# Patient Record
Sex: MALE | Race: WHITE | NOT HISPANIC OR LATINO | Employment: FULL TIME | URBAN - METROPOLITAN AREA
[De-identification: names, ages, dates, MRNs, and addresses within clinical notes are randomized per-mention and may not be internally consistent; named-entity substitution may affect disease eponyms.]

---

## 2020-06-15 ENCOUNTER — HISTORICAL (OUTPATIENT)
Dept: ADMINISTRATIVE | Facility: HOSPITAL | Age: 50
End: 2020-06-15

## 2020-09-24 ENCOUNTER — HISTORICAL (OUTPATIENT)
Dept: ADMINISTRATIVE | Facility: HOSPITAL | Age: 50
End: 2020-09-24

## 2020-09-24 LAB
(HCYS)2 SERPL-MCNC: 6.64 UMOL/L (ref 5.08–15.39)
ABS NEUT (OLG): 2.8 X10(3)/MCL (ref 2.1–9.2)
ALBUMIN SERPL-MCNC: 5 GM/DL (ref 3.4–5)
ALBUMIN/GLOB SERPL: 2.17 {RATIO} (ref 1.5–2.5)
ALP SERPL-CCNC: 67 UNIT/L (ref 38–126)
ALT SERPL-CCNC: 47 UNIT/L (ref 7–52)
APPEARANCE, UA: CLEAR
AST SERPL-CCNC: 39 UNIT/L (ref 15–37)
BACTERIA #/AREA URNS AUTO: NORMAL /HPF
BILIRUB SERPL-MCNC: 1.2 MG/DL (ref 0.2–1)
BILIRUB UR QL STRIP: NEGATIVE MG/DL
BILIRUBIN DIRECT+TOT PNL SERPL-MCNC: 0.3 MG/DL (ref 0–0.5)
BILIRUBIN DIRECT+TOT PNL SERPL-MCNC: 0.9 MG/DL
BUN SERPL-MCNC: 19 MG/DL (ref 7–18)
CALCIUM SERPL-MCNC: 10 MG/DL (ref 8.5–10.1)
CHLORIDE SERPL-SCNC: 99 MMOL/L (ref 98–107)
CHOLEST SERPL-MCNC: 198 MG/DL (ref 0–200)
CHOLEST/HDLC SERPL: 4.1 {RATIO}
CO2 SERPL-SCNC: 29 MMOL/L (ref 21–32)
COLOR UR: NORMAL
CREAT SERPL-MCNC: 1.03 MG/DL (ref 0.6–1.3)
DEPRECATED CALCIDIOL+CALCIFEROL SERPL-MC: 93.7 NG/ML (ref 30–80)
ERYTHROCYTE [DISTWIDTH] IN BLOOD BY AUTOMATED COUNT: 12.6 % (ref 11.5–17)
EST. AVERAGE GLUCOSE BLD GHB EST-MCNC: 105 MG/DL
GLOBULIN SER-MCNC: 2.3 GM/DL (ref 1.2–3)
GLUCOSE (UA): NEGATIVE MG/DL
GLUCOSE SERPL-MCNC: 95 MG/DL (ref 74–106)
HBA1C MFR BLD: 5.3 % (ref 4.4–6.4)
HCT VFR BLD AUTO: 51.3 % (ref 42–52)
HDLC SERPL-MCNC: 48 MG/DL (ref 35–60)
HGB BLD-MCNC: 17.5 GM/DL (ref 14–18)
HGB UR QL STRIP: NEGATIVE UNIT/L
KETONES UR QL STRIP: NEGATIVE MG/DL
LDLC SERPL CALC-MCNC: 147 MG/DL (ref 0–129)
LEUKOCYTE ESTERASE UR QL STRIP: NEGATIVE UNIT/L
LYMPHOCYTES # BLD AUTO: 2.3 X10(3)/MCL (ref 0.6–3.4)
LYMPHOCYTES NFR BLD AUTO: 39.7 % (ref 13–40)
MCH RBC QN AUTO: 30 PG (ref 27–31.2)
MCHC RBC AUTO-ENTMCNC: 34 GM/DL (ref 32–36)
MCV RBC AUTO: 88 FL (ref 80–94)
MONOCYTES # BLD AUTO: 0.7 X10(3)/MCL (ref 0.1–1.3)
MONOCYTES NFR BLD AUTO: 12.5 % (ref 0.1–24)
NEUTROPHILS NFR BLD AUTO: 47.8 % (ref 47–80)
NITRITE UR QL STRIP.AUTO: NEGATIVE
PH UR STRIP: 5.5 [PH]
PLATELET # BLD AUTO: 231 X10(3)/MCL (ref 130–400)
PMV BLD AUTO: 8.5 FL (ref 9.4–12.4)
POTASSIUM SERPL-SCNC: 4 MMOL/L (ref 3.5–5.1)
PROT SERPL-MCNC: 7.3 GM/DL (ref 6.4–8.2)
PROT UR QL STRIP: NEGATIVE MG/DL
PSA SERPL-MCNC: 0.19 NG/ML (ref 0–3.5)
RBC # BLD AUTO: 5.83 X10(6)/MCL (ref 4.7–6.1)
RBC #/AREA URNS HPF: NORMAL /HPF
SODIUM SERPL-SCNC: 137 MMOL/L (ref 136–145)
SP GR UR STRIP: 1.02
SQUAMOUS EPITHELIAL, UA: NORMAL /LPF
TESTOST SERPL-MCNC: 341 NG/DL (ref 300–1060)
TRIGL SERPL-MCNC: 107 MG/DL (ref 30–150)
TSH SERPL-ACNC: 1.4 MIU/ML (ref 0.35–4.94)
UROBILINOGEN UR STRIP-ACNC: 0.2 MG/DL
VLDLC SERPL CALC-MCNC: 21.4 MG/DL
WBC # SPEC AUTO: 5.8 X10(3)/MCL (ref 4.5–11.5)
WBC #/AREA URNS AUTO: NORMAL /[HPF]

## 2021-05-21 ENCOUNTER — HISTORICAL (OUTPATIENT)
Dept: ADMINISTRATIVE | Facility: HOSPITAL | Age: 51
End: 2021-05-21

## 2021-05-21 LAB
ALBUMIN SERPL-MCNC: 4.9 GM/DL (ref 3.4–5)
ALBUMIN/GLOB SERPL: 2.23 {RATIO} (ref 1.5–2.5)
ALP SERPL-CCNC: 85 UNIT/L (ref 38–126)
ALT SERPL-CCNC: 52 UNIT/L (ref 7–52)
AST SERPL-CCNC: 38 UNIT/L (ref 15–37)
BILIRUB SERPL-MCNC: 0.7 MG/DL (ref 0.2–1)
BILIRUBIN DIRECT+TOT PNL SERPL-MCNC: 0.2 MG/DL (ref 0–0.5)
BILIRUBIN DIRECT+TOT PNL SERPL-MCNC: 0.5 MG/DL
BUN SERPL-MCNC: 17 MG/DL (ref 7–18)
CALCIUM SERPL-MCNC: 9.9 MG/DL (ref 8.5–10.1)
CHLORIDE SERPL-SCNC: 102 MMOL/L (ref 98–107)
CHOLEST SERPL-MCNC: 199 MG/DL (ref 0–200)
CHOLEST/HDLC SERPL: 4.3 {RATIO}
CO2 SERPL-SCNC: 30 MMOL/L (ref 21–32)
CREAT SERPL-MCNC: 1 MG/DL (ref 0.6–1.3)
GLOBULIN SER-MCNC: 2.2 GM/DL (ref 1.2–3)
GLUCOSE SERPL-MCNC: 104 MG/DL (ref 74–106)
HDLC SERPL-MCNC: 46 MG/DL (ref 35–60)
LDLC SERPL CALC-MCNC: 142 MG/DL (ref 0–129)
POTASSIUM SERPL-SCNC: 4.8 MMOL/L (ref 3.5–5.1)
PROT SERPL-MCNC: 7.1 GM/DL (ref 6.4–8.2)
SODIUM SERPL-SCNC: 142 MMOL/L (ref 136–145)
TRIGL SERPL-MCNC: 93 MG/DL (ref 30–150)
VLDLC SERPL CALC-MCNC: 18.6 MG/DL

## 2022-04-10 ENCOUNTER — HISTORICAL (OUTPATIENT)
Dept: ADMINISTRATIVE | Facility: HOSPITAL | Age: 52
End: 2022-04-10

## 2022-04-26 VITALS
WEIGHT: 205 LBS | HEIGHT: 70 IN | DIASTOLIC BLOOD PRESSURE: 84 MMHG | BODY MASS INDEX: 29.35 KG/M2 | SYSTOLIC BLOOD PRESSURE: 130 MMHG

## 2022-08-23 PROBLEM — Z00.00 WELLNESS EXAMINATION: Status: ACTIVE | Noted: 2022-08-23

## 2022-11-28 PROBLEM — Z00.00 WELLNESS EXAMINATION: Status: RESOLVED | Noted: 2022-08-23 | Resolved: 2022-11-28

## 2023-06-20 ENCOUNTER — OFFICE VISIT (OUTPATIENT)
Dept: NEUROLOGY | Facility: CLINIC | Age: 53
End: 2023-06-20
Payer: COMMERCIAL

## 2023-06-20 VITALS
SYSTOLIC BLOOD PRESSURE: 142 MMHG | BODY MASS INDEX: 29.62 KG/M2 | WEIGHT: 200 LBS | HEIGHT: 69 IN | DIASTOLIC BLOOD PRESSURE: 94 MMHG

## 2023-06-20 DIAGNOSIS — R25.1 TREMOR, UNSPECIFIED: Primary | ICD-10-CM

## 2023-06-20 PROCEDURE — 99999 PR PBB SHADOW E&M-EST. PATIENT-LVL III: ICD-10-PCS | Mod: PBBFAC,,, | Performed by: SPECIALIST

## 2023-06-20 PROCEDURE — 3077F SYST BP >= 140 MM HG: CPT | Mod: CPTII,S$GLB,, | Performed by: SPECIALIST

## 2023-06-20 PROCEDURE — 1160F RVW MEDS BY RX/DR IN RCRD: CPT | Mod: CPTII,S$GLB,, | Performed by: SPECIALIST

## 2023-06-20 PROCEDURE — 99999 PR PBB SHADOW E&M-EST. PATIENT-LVL III: CPT | Mod: PBBFAC,,, | Performed by: SPECIALIST

## 2023-06-20 PROCEDURE — 99204 OFFICE O/P NEW MOD 45 MIN: CPT | Mod: S$GLB,,, | Performed by: SPECIALIST

## 2023-06-20 PROCEDURE — 1160F PR REVIEW ALL MEDS BY PRESCRIBER/CLIN PHARMACIST DOCUMENTED: ICD-10-PCS | Mod: CPTII,S$GLB,, | Performed by: SPECIALIST

## 2023-06-20 PROCEDURE — 3008F BODY MASS INDEX DOCD: CPT | Mod: CPTII,S$GLB,, | Performed by: SPECIALIST

## 2023-06-20 PROCEDURE — 3080F PR MOST RECENT DIASTOLIC BLOOD PRESSURE >= 90 MM HG: ICD-10-PCS | Mod: CPTII,S$GLB,, | Performed by: SPECIALIST

## 2023-06-20 PROCEDURE — 3008F PR BODY MASS INDEX (BMI) DOCUMENTED: ICD-10-PCS | Mod: CPTII,S$GLB,, | Performed by: SPECIALIST

## 2023-06-20 PROCEDURE — 3077F PR MOST RECENT SYSTOLIC BLOOD PRESSURE >= 140 MM HG: ICD-10-PCS | Mod: CPTII,S$GLB,, | Performed by: SPECIALIST

## 2023-06-20 PROCEDURE — 1159F PR MEDICATION LIST DOCUMENTED IN MEDICAL RECORD: ICD-10-PCS | Mod: CPTII,S$GLB,, | Performed by: SPECIALIST

## 2023-06-20 PROCEDURE — 3080F DIAST BP >= 90 MM HG: CPT | Mod: CPTII,S$GLB,, | Performed by: SPECIALIST

## 2023-06-20 PROCEDURE — 1159F MED LIST DOCD IN RCRD: CPT | Mod: CPTII,S$GLB,, | Performed by: SPECIALIST

## 2023-06-20 PROCEDURE — 99204 PR OFFICE/OUTPT VISIT, NEW, LEVL IV, 45-59 MIN: ICD-10-PCS | Mod: S$GLB,,, | Performed by: SPECIALIST

## 2023-06-20 RX ORDER — FEXOFENADINE HCL AND PSEUDOEPHEDRINE HCI 180; 240 MG/1; MG/1
1 TABLET, EXTENDED RELEASE ORAL DAILY
COMMUNITY

## 2023-06-20 NOTE — PROGRESS NOTES
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    Chief Complaint: Tremors     HPI:            (The pt states that the tremors has been going on for about a year and this is the first time seeking treatment for tremors. The tremors are in both arms, cheek, tongue, and legs. The pt states that the first tremors noted in the left arm.  )      Notes may also be on facesheet for HPI, ROS, and other sections     Review of Systems   No RBD      Social History     Tobacco Use    Smoking status: Never    Smokeless tobacco: Never   Substance Use Topics    Alcohol use: Not Currently    Drug use: Never      [] Single     [x]     [] []    [x] Working     [] Retired, worked as:   [x] Drives     [] Does not drive       ----------------------------  ADHD  GERD (gastroesophageal reflux disease)  Nontoxic single thyroid nodule  SI (sacroiliac) joint dysfunction    Current Outpatient Medications   Medication Instructions    dextroamphetamine-amphetamine 5 mg Tab 10 mg, Oral, 2 times daily    diphenhydrAMINE (SOMINEX) 25 mg, Oral, Nightly PRN    famotidine (PEPCID) 20 mg, Oral, 2 times daily    fexofenadine-pseudoephedrine (ALLEGRA-D 24 HOUR) 180-240 mg per 24 hr tablet 1 tablet, Oral, Daily    melatonin 10 mg Tab Oral    montelukast (SINGULAIR) 10 mg, Oral, Daily      There are no discontinued medications.      Objective:        Exam:   Visit Vitals  BP (!) 142/94 (BP Location: Left arm, Patient Position: Sitting, BP Method: Medium (Manual))   Ht 5' 9" (1.753 m)   Wt 90.7 kg (200 lb)   BMI 29.53 kg/m²       General Exam  [] Unaccompanied   [x] Accompanied, by__ [x]Spouse     []Child            []_____________            body habitus_ Body mass index is 29.53 kg/m².    [x]Gen exam overall unremarkable    []Abnormalities, if present, checked     []Mental Status_alert and appropriate    []Oropharynx_Mallampati grade_1 or 2  [] OP   M3    [] M4  []Neck_ no bruits     [] Bruit "   []Heart__ RRR s murmurs or extra beats  [] Irreg  []murmur  []Extremities_ no edema or lesions   [] Extr edema     Neurological:  [x]Normal neuro exam            []Cortical function seems normal  []Abnormalities, if present, checked     []Speech __ normal    []    []Cranial nerves:    []  []CN 2 VF_ok     []  []Fundi_ normal     []  []CN 3, 4, 6 EOMs_ok   []  []CN 3, pupils_ok    []  []CN 7_no lower face asymmetry  []  []CN 8_hearing _ ok   []  []CN 12 tongue_ok    []    []Motor__ normal all groups    []  []Tone: normal     []  []Reflexes__ normal or unremarkable   []  []Vib Sens_ normal in extr's incl toes   []  []Pin Sens_    []  []Plantars__ flat     []  []Tremor: _ none     []  []Coordination: _ F to N normal; dexterity normal  []  []Gait_ incl toes heels and tandem   []  []Romberg: negative    []      Decreased blink freq       Neuroimaging:  Study / Studies:   [] Images and imaging reports reviewed.      Rads summary:          My comments:     Labs:      [x]  New Patient     []  Multiple Issues/ diagnoses or problems  [if not enumerated in note then discussed but not documented]    Complexity of Data:      [] High   [x] Moderate   [] Images and reports reviewed:  [] History obtained from family or accompaniment:   [] Other studies reviewed   [x] Studies considered or discussed but not ordered __ DELON scan  [] Studies ordered __   [x] Differential Diagnoses discussed __    Risks:   [] High   [x] Moderate   [x] (poss or definite) neurodegenerative condition and inherent progression  [] () autoimmune condition with possibility of flares or unexpected attack  [] () seiz d.o. with possib of recurr seiz's   [] Cerebrovasc ds with risk of recurrent stroke  [] CNS meds (and/or) potentially high risk non CNS meds taken or discussed which may cause med or behav SE's  [] Fall risk  [] Driving discussed   [x] Diagnosis unclear or DDx wide making risk uncertain   []:    MDM:      [] High      [x] Moderate          Assessment/Plan:         ICD-10-CM ICD-9-CM   1. Tremor, unspecified  R25.1 781.0   Possible incipient PD;  possible ET    Discussed DELON scan  did not order     Discussed scheduling fu but he preferred to leave unscheduled at present           Other Comments / Follow Up:          Follow up for PRN.        MD BHARAT KyleA  [facility] Medical Director   FAAN, FAA

## 2023-08-25 ENCOUNTER — HOSPITAL ENCOUNTER (EMERGENCY)
Facility: HOSPITAL | Age: 53
Discharge: HOME | End: 2023-08-25
Attending: EMERGENCY MEDICINE
Payer: COMMERCIAL

## 2023-08-25 VITALS
SYSTOLIC BLOOD PRESSURE: 122 MMHG | HEIGHT: 69 IN | TEMPERATURE: 98 F | HEART RATE: 89 BPM | DIASTOLIC BLOOD PRESSURE: 78 MMHG | WEIGHT: 200 LBS | BODY MASS INDEX: 29.62 KG/M2 | OXYGEN SATURATION: 95 % | RESPIRATION RATE: 19 BRPM

## 2023-08-25 DIAGNOSIS — K80.50 BILIARY COLIC: Primary | ICD-10-CM

## 2023-08-25 DIAGNOSIS — R10.13 EPIGASTRIC PAIN: ICD-10-CM

## 2023-08-25 LAB
ALBUMIN SERPL-MCNC: 4.8 G/DL (ref 3.5–5)
ALBUMIN/GLOB SERPL: 1.8 RATIO (ref 1.1–2)
ALP SERPL-CCNC: 82 UNIT/L (ref 40–150)
ALT SERPL-CCNC: 70 UNIT/L (ref 0–55)
APPEARANCE UR: CLEAR
AST SERPL-CCNC: 47 UNIT/L (ref 5–34)
BACTERIA #/AREA URNS AUTO: NORMAL /HPF
BASOPHILS # BLD AUTO: 0.06 X10(3)/MCL
BASOPHILS NFR BLD AUTO: 0.4 %
BILIRUB SERPL-MCNC: 1 MG/DL
BILIRUB UR QL STRIP.AUTO: NEGATIVE
BUN SERPL-MCNC: 17.3 MG/DL (ref 8.4–25.7)
CALCIUM SERPL-MCNC: 10.2 MG/DL (ref 8.4–10.2)
CHLORIDE SERPL-SCNC: 104 MMOL/L (ref 98–107)
CO2 SERPL-SCNC: 23 MMOL/L (ref 22–29)
COLOR UR: ABNORMAL
CREAT SERPL-MCNC: 0.9 MG/DL (ref 0.73–1.18)
EOSINOPHIL # BLD AUTO: 0 X10(3)/MCL (ref 0–0.9)
EOSINOPHIL NFR BLD AUTO: 0 %
ERYTHROCYTE [DISTWIDTH] IN BLOOD BY AUTOMATED COUNT: 12.3 % (ref 11.5–17)
GFR SERPLBLD CREATININE-BSD FMLA CKD-EPI: >60 MLS/MIN/1.73/M2
GLOBULIN SER-MCNC: 2.7 GM/DL (ref 2.4–3.5)
GLUCOSE SERPL-MCNC: 137 MG/DL (ref 74–100)
GLUCOSE UR QL STRIP.AUTO: NEGATIVE
HCT VFR BLD AUTO: 45 % (ref 42–52)
HGB BLD-MCNC: 16.2 G/DL (ref 14–18)
IMM GRANULOCYTES # BLD AUTO: 0.04 X10(3)/MCL (ref 0–0.04)
IMM GRANULOCYTES NFR BLD AUTO: 0.3 %
KETONES UR QL STRIP.AUTO: ABNORMAL
LEUKOCYTE ESTERASE UR QL STRIP.AUTO: NEGATIVE
LIPASE SERPL-CCNC: 15 U/L
LYMPHOCYTES # BLD AUTO: 0.97 X10(3)/MCL (ref 0.6–4.6)
LYMPHOCYTES NFR BLD AUTO: 6.5 %
MCH RBC QN AUTO: 30.4 PG (ref 27–31)
MCHC RBC AUTO-ENTMCNC: 36 G/DL (ref 33–36)
MCV RBC AUTO: 84.4 FL (ref 80–94)
MONOCYTES # BLD AUTO: 0.78 X10(3)/MCL (ref 0.1–1.3)
MONOCYTES NFR BLD AUTO: 5.2 %
NEUTROPHILS # BLD AUTO: 13.03 X10(3)/MCL (ref 2.1–9.2)
NEUTROPHILS NFR BLD AUTO: 87.6 %
NITRITE UR QL STRIP.AUTO: NEGATIVE
NRBC BLD AUTO-RTO: 0 %
PH UR STRIP.AUTO: 5.5 [PH]
PLATELET # BLD AUTO: 268 X10(3)/MCL (ref 130–400)
PMV BLD AUTO: 8.8 FL (ref 7.4–10.4)
POTASSIUM SERPL-SCNC: 4 MMOL/L (ref 3.5–5.1)
PROT SERPL-MCNC: 7.5 GM/DL (ref 6.4–8.3)
PROT UR QL STRIP.AUTO: ABNORMAL
RBC # BLD AUTO: 5.33 X10(6)/MCL (ref 4.7–6.1)
RBC #/AREA URNS AUTO: <5 /HPF
RBC UR QL AUTO: NEGATIVE
SODIUM SERPL-SCNC: 138 MMOL/L (ref 136–145)
SP GR UR STRIP.AUTO: 1.03 (ref 1–1.03)
SQUAMOUS #/AREA URNS AUTO: <5 /HPF
TROPONIN I SERPL-MCNC: <0.01 NG/ML (ref 0–0.04)
UROBILINOGEN UR STRIP-ACNC: 1
WBC # SPEC AUTO: 14.88 X10(3)/MCL (ref 4.5–11.5)
WBC #/AREA URNS AUTO: <5 /HPF

## 2023-08-25 PROCEDURE — 83690 ASSAY OF LIPASE: CPT

## 2023-08-25 PROCEDURE — 85025 COMPLETE CBC W/AUTO DIFF WBC: CPT

## 2023-08-25 PROCEDURE — 99285 EMERGENCY DEPT VISIT HI MDM: CPT | Mod: 25

## 2023-08-25 PROCEDURE — 25000003 PHARM REV CODE 250: Performed by: EMERGENCY MEDICINE

## 2023-08-25 PROCEDURE — 96361 HYDRATE IV INFUSION ADD-ON: CPT

## 2023-08-25 PROCEDURE — 81001 URINALYSIS AUTO W/SCOPE: CPT

## 2023-08-25 PROCEDURE — 96375 TX/PRO/DX INJ NEW DRUG ADDON: CPT

## 2023-08-25 PROCEDURE — 84484 ASSAY OF TROPONIN QUANT: CPT

## 2023-08-25 PROCEDURE — 80053 COMPREHEN METABOLIC PANEL: CPT

## 2023-08-25 PROCEDURE — 93005 ELECTROCARDIOGRAM TRACING: CPT

## 2023-08-25 PROCEDURE — 96374 THER/PROPH/DIAG INJ IV PUSH: CPT

## 2023-08-25 PROCEDURE — 63600175 PHARM REV CODE 636 W HCPCS

## 2023-08-25 PROCEDURE — 63600175 PHARM REV CODE 636 W HCPCS: Performed by: EMERGENCY MEDICINE

## 2023-08-25 RX ORDER — ONDANSETRON 4 MG/1
4 TABLET, ORALLY DISINTEGRATING ORAL EVERY 6 HOURS PRN
Qty: 20 TABLET | Refills: 0 | Status: ON HOLD | OUTPATIENT
Start: 2023-08-25 | End: 2023-09-01 | Stop reason: SDUPTHER

## 2023-08-25 RX ORDER — FAMOTIDINE 40 MG/1
40 TABLET, FILM COATED ORAL NIGHTLY
Qty: 30 TABLET | Refills: 0 | Status: SHIPPED | OUTPATIENT
Start: 2023-08-25 | End: 2023-09-24

## 2023-08-25 RX ORDER — METOCLOPRAMIDE HYDROCHLORIDE 5 MG/ML
10 INJECTION INTRAMUSCULAR; INTRAVENOUS
Status: COMPLETED | OUTPATIENT
Start: 2023-08-25 | End: 2023-08-25

## 2023-08-25 RX ORDER — FAMOTIDINE 10 MG/ML
40 INJECTION INTRAVENOUS
Status: COMPLETED | OUTPATIENT
Start: 2023-08-25 | End: 2023-08-25

## 2023-08-25 RX ADMIN — METOCLOPRAMIDE 10 MG: 5 INJECTION, SOLUTION INTRAMUSCULAR; INTRAVENOUS at 10:08

## 2023-08-25 RX ADMIN — SODIUM CHLORIDE, POTASSIUM CHLORIDE, SODIUM LACTATE AND CALCIUM CHLORIDE 1000 ML: 600; 310; 30; 20 INJECTION, SOLUTION INTRAVENOUS at 10:08

## 2023-08-25 RX ADMIN — FAMOTIDINE 40 MG: 10 INJECTION, SOLUTION INTRAVENOUS at 10:08

## 2023-08-26 NOTE — FIRST PROVIDER EVALUATION
"Medical screening examination initiated.  I have conducted a focused provider triage encounter, findings are as follows:    Brief history of present illness:  52 year old male presents to ER with c/o diffuse abdominal pain onset 10 am. Reports history of GERD. +N/V    Vitals:    08/25/23 2030   BP: (!) 147/87   Pulse: 89   Resp: 16   Temp: 97.8 °F (36.6 °C)   TempSrc: Oral   SpO2: 97%   Weight: 90.7 kg (200 lb)   Height: 5' 9" (1.753 m)       Pertinent physical exam:  Awake and alert, NAD    Brief workup plan:  Labs, UA, imaging    Preliminary workup initiated; this workup will be continued and followed by the physician or advanced practice provider that is assigned to the patient when roomed.  "

## 2023-08-26 NOTE — ED PROVIDER NOTES
Encounter Date: 8/25/2023    SCRIBE #1 NOTE: I, Bruna Desir, am scribing for, and in the presence of,  Barbi Barnett MD. I have scribed the following portions of the note - Other sections scribed: HPI, ROS, PE.       History     Chief Complaint   Patient presents with    Abdominal Pain     Pt reports abdominal pain that started this morning diffuse across abdomen. Covid neg this afternoon, Denies diarrhea. States abdominal pain radiates to epigastric and reports history of GERD.      52 year old male with a history of GERD, sacroilial joint dysfunction, nontoxic single thyroid nodule, vasectomy, and peg tube removal presents to ED for pressure-like epigastric abdominal pain that radiates down his sides since 10:00 today. He rates the pain as 8/10 initially, but improved to 6/10 currently. States the pain on his sides are 4/10. Pt says he tried to vomit and pass bowel movements with no relief, last BM this morning. Reports decreased urination and pain worsen with liquid intake. He denies diarrhea, eating spicy food, smoking, fever, and EtOH consumption.  Pt reports he was exposed to Covid yesterday, and was found negative after getting tested earlier this afternoon. Notes a previous similar event last year. Also notes taking 4 mg Zofran at 14:00 today provided relief for a few hours.    PCP: Low Yoon MD        The history is provided by the patient. No  was used.     Review of patient's allergies indicates:  No Known Allergies  Past Medical History:   Diagnosis Date    ADHD     GERD (gastroesophageal reflux disease)     Nontoxic single thyroid nodule     SI (sacroiliac) joint dysfunction      Past Surgical History:   Procedure Laterality Date    PEG TUBE REMOVAL      SINUS SURGERY      VASECTOMY      WISDOM TOOTH EXTRACTION       No family history on file.  Social History     Tobacco Use    Smoking status: Never    Smokeless tobacco: Never   Substance Use Topics    Alcohol use: Not  Currently    Drug use: Never     Review of Systems   Constitutional:  Negative for chills, diaphoresis and fever.   HENT:  Negative for congestion, ear pain, sinus pain and sore throat.    Eyes:  Negative for pain, discharge and visual disturbance.   Respiratory:  Negative for cough, shortness of breath, wheezing and stridor.    Cardiovascular:  Negative for chest pain and palpitations.   Gastrointestinal:  Positive for abdominal pain, nausea and vomiting. Negative for constipation, diarrhea and rectal pain.   Genitourinary:  Positive for decreased urine volume. Negative for dysuria and hematuria.   Musculoskeletal:  Negative for back pain and myalgias.   Skin:  Negative for rash.   Neurological:  Negative for dizziness, syncope, numbness and headaches.   Hematological: Negative.    Psychiatric/Behavioral: Negative.     All other systems reviewed and are negative.      Physical Exam     Initial Vitals [08/25/23 2030]   BP Pulse Resp Temp SpO2   (!) 147/87 89 16 97.8 °F (36.6 °C) 97 %      MAP       --         Physical Exam    Nursing note and vitals reviewed.  Constitutional: He appears well-developed and well-nourished. He is not diaphoretic. No distress.   HENT:   Head: Normocephalic and atraumatic.   Nose: Nose normal.   Mouth/Throat: Oropharynx is clear and moist.   Eyes: Conjunctivae and EOM are normal. Pupils are equal, round, and reactive to light.   Neck: Trachea normal. Neck supple.   Normal range of motion.  Cardiovascular:  Normal rate, regular rhythm, normal heart sounds and intact distal pulses.     Exam reveals no gallop and no friction rub.       No murmur heard.  Pulmonary/Chest: Breath sounds normal. No respiratory distress. He has no wheezes. He has no rhonchi. He has no rales. He exhibits no tenderness.   Abdominal: Abdomen is soft. He exhibits no distension and no mass. Bowel sounds are increased. There is generalized abdominal tenderness and tenderness in the right upper quadrant.   Hyperactive  bowel sounds There is positive Rushing's sign. There is no rebound.   Musculoskeletal:         General: No tenderness or edema. Normal range of motion.      Cervical back: Normal range of motion and neck supple.      Lumbar back: Normal. No tenderness. Normal range of motion.     Neurological: He is alert and oriented to person, place, and time. He has normal strength. No cranial nerve deficit or sensory deficit.   Skin: Skin is warm and dry. Capillary refill takes less than 2 seconds. No rash and no abscess noted. No erythema. No pallor.   Psychiatric: He has a normal mood and affect. His behavior is normal. Judgment and thought content normal.         ED Course   Procedures  Labs Reviewed   COMPREHENSIVE METABOLIC PANEL - Abnormal; Notable for the following components:       Result Value    Glucose Level 137 (*)     Alanine Aminotransferase 70 (*)     Aspartate Aminotransferase 47 (*)     All other components within normal limits   URINALYSIS, REFLEX TO URINE CULTURE - Abnormal; Notable for the following components:    Specific Gravity, UA 1.031 (*)     Protein, UA Trace (*)     Ketones, UA Trace (*)     All other components within normal limits   CBC WITH DIFFERENTIAL - Abnormal; Notable for the following components:    WBC 14.88 (*)     Neut # 13.03 (*)     All other components within normal limits   LIPASE - Normal   TROPONIN I - Normal   URINALYSIS, MICROSCOPIC - Normal   CBC W/ AUTO DIFFERENTIAL    Narrative:     The following orders were created for panel order CBC auto differential.  Procedure                               Abnormality         Status                     ---------                               -----------         ------                     CBC with Differential[[id number]]        Abnormal            Final result                 Please view results for these tests on the individual orders.          Imaging Results              US Abdomen Limited (Final result)  Result time 08/25/23 23:05:11       Final result by Juan Nguyen MD (08/25/23 23:05:11)                   Impression:      1. Diffuse hepatic steatosis.  2. Gallbladder sludge and cholelithiasis without cholecystitis.      Electronically signed by: Juan Nguyen MD  Date:    08/25/2023  Time:    23:05               Narrative:    EXAMINATION:  US ABDOMEN LIMITED    CLINICAL HISTORY:  right upper quadrant pain;    TECHNIQUE:  Grayscale and color Doppler images of the abdomen are obtained.    COMPARISON:  None    FINDINGS:  The pancreas is not well visualized due to overlying bowel gas.  The aorta is nonaneurysmal.    The liver is diffusely hyperechogenic.  There is no solid intrahepatic mass.  There is a simple cyst in the left hepatic lobe measuring 2.5 cm.  The main portal vein is patent with hepatopetal flow.  The CBD measures 5 mm.  There is echogenic sludge in the gallbladder.  There is a stone in the gallbladder.  There is no wall thickening or pericholecystic fluid.  There is no sonographic Rushing sign.  There is no ascites.    The right kidney measures 11.4 cm.  There is no hydronephrosis or nephrolithiasis of the right kidney.                                       Medications   metoclopramide HCl injection 10 mg (10 mg Intravenous Given 8/25/23 2210)   lactated ringers bolus 1,000 mL (0 mLs Intravenous Stopped 8/25/23 2311)   famotidine (PF) injection 40 mg (40 mg Intravenous Given 8/25/23 2211)     Medical Decision Making  The differential diagnosis includes, but is not limited to: pancreatitis, GERD, and biliary colic.   Cbc, cmp, trop, lipase, ua, abd us, ekg ordered and reviewed   Mild leukocytosis and transaminitis (similar to previous) noted  Given reglan, ivf and pepcid with improvement  Us with signs of cholelithiasis without cholecystitis  Comfortable with dc with symptomatic managemetna nd outpatient surgery erica  Discussed strict return precautions and he voiced understanding and agrees and is comfortable with plan    Problems  Addressed:  Biliary colic: acute illness or injury that poses a threat to life or bodily functions  Epigastric pain: acute illness or injury that poses a threat to life or bodily functions    Amount and/or Complexity of Data Reviewed  External Data Reviewed: labs.  Labs: ordered. Decision-making details documented in ED Course.  Radiology: ordered and independent interpretation performed. Decision-making details documented in ED Course.    Risk  OTC drugs.  Prescription drug management.            Scribe Attestation:   Scribe #1: I performed the above scribed service and the documentation accurately describes the services I performed. I attest to the accuracy of the note.  Comments: Attending:   Physician Attestation Statement for Scribe #1: Barbi MICHAEL MD, personally performed the services described in this documentation. All medical record entries made by the scribe were at my direction and in my presence.  I have reviewed the chart and agree that the record reflects my personal performance and is accurate and complete.        Attending Attestation:           Physician Attestation for Scribe:  Physician Attestation Statement for Scribe #1: Anibal MICHAEL Brooke R, MD, reviewed documentation, as scribed by Bruna Desir in my presence, and it is both accurate and complete.             ED Course as of 08/26/23 0216   Fri Aug 25, 2023   2208 Ekg performed at 2205 rate 82 bpm sinus rhythm without acute st or t wave abnormality [BS]   2258 Pain improved [BS]   2332 Feeling better, comfortable with dc [BS]      ED Course User Index  [BS] Barbi Barnett MD               Medical Decision Making:   History:   Old Medical Records: I decided to obtain old medical records.  Old Records Summarized: records from clinic visits.       <> Summary of Records: adhd  Initial Assessment:   See hpi  Independently Interpreted Test(s):   I have ordered and independently interpreted EKG Reading(s) - see prior notes  Clinical  Tests:   Lab Tests: Ordered and Reviewed  Radiological Study: Reviewed and Ordered  Medical Tests: Ordered and Reviewed      Clinical Impression:   Final diagnoses:  [R10.13] Epigastric pain  [K80.50] Biliary colic (Primary)        ED Disposition Condition    Discharge Stable          ED Prescriptions       Medication Sig Dispense Start Date End Date Auth. Provider    famotidine (PEPCID) 40 MG tablet Take 1 tablet (40 mg total) by mouth every evening. 30 tablet 8/25/2023 9/24/2023 Barbi Barnett MD    ondansetron (ZOFRAN-ODT) 4 MG TbDL Take 1 tablet (4 mg total) by mouth every 6 (six) hours as needed (nausea, vomiting). 20 tablet 8/25/2023 -- Barbi Barnett MD          Follow-up Information       Follow up With Specialties Details Why Contact Info    Zi Rubi MD General Surgery Schedule an appointment as soon as possible for a visit   [address]  [address]  [facility] [address]  [phone]      [facility] - Emergency Dept Emergency Medicine  As needed, If symptoms worsen [address] [place] [id number] [phone]             Barbi Barnett MD  08/26/23 0216

## 2023-08-26 NOTE — DISCHARGE INSTRUCTIONS
Schedule an appointment with the surgeon of your choice, avoid fatty foods, return for re-evaluation if you experience any worsening

## 2023-08-29 PROBLEM — K80.20 CHOLELITHIASES: Status: ACTIVE | Noted: 2023-08-29

## 2023-08-29 NOTE — H&P
[facility] Surgical - Periop Services  General Surgery  History & Physical    Patient Name: Indira Robles  MRN: [id number]  Admission Date: 9/1/23  Attending Physician: Kobe Keenan MD   Primary Care Provider: Ginny Primary Doctor    Patient information was obtained from patient.     Subjective:     Chief Complaint/Reason for Admission: Abd pain     History of Present Illness:  Patient is a 52 y.o. male presents with symptomatic cholelithiasis. Ready to proceed with Lap leonardo today.    No current facility-administered medications on file prior to encounter.     Current Outpatient Medications on File Prior to Encounter   Medication Sig    dextroamphetamine-amphetamine 5 mg Tab Take 10 mg by mouth 2 (two) times daily. (Patient not taking: Reported on 6/20/2023)    diphenhydrAMINE (SOMINEX) 25 mg tablet Take 25 mg by mouth nightly as needed for Insomnia.    famotidine (PEPCID) 40 MG tablet Take 1 tablet (40 mg total) by mouth every evening.    fexofenadine-pseudoephedrine (ALLEGRA-D 24 HOUR) 180-240 mg per 24 hr tablet Take 1 tablet by mouth once daily.    melatonin 10 mg Tab Take by mouth.    montelukast (SINGULAIR) 10 mg tablet Take 10 mg by mouth once daily at 6am.    ondansetron (ZOFRAN-ODT) 4 MG TbDL Take 1 tablet (4 mg total) by mouth every 6 (six) hours as needed (nausea, vomiting).       Review of patient's allergies indicates:  No Known Allergies    Past Medical History:   Diagnosis Date    ADHD     GERD (gastroesophageal reflux disease)     Nontoxic single thyroid nodule     SI (sacroiliac) joint dysfunction      Past Surgical History:   Procedure Laterality Date    SINUS SURGERY      VASECTOMY      WISDOM TOOTH EXTRACTION       Family History    None       Tobacco Use    Smoking status: Never    Smokeless tobacco: Never   Substance and Sexual Activity    Alcohol use: Not Currently    Drug use: Never    Sexual activity: Yes     Partners: Female     Review of Systems   HENT: Negative.      Cardiovascular: Negative.    Gastrointestinal:  Positive for abdominal pain.   Musculoskeletal: Negative.    Psychiatric/Behavioral: Negative.       Objective:     Vital Signs (Most Recent):    Vital Signs (24h Range):        Weight: 90.7 kg (200 lb)  Body mass index is 29.53 kg/m².    Physical Exam  Vitals reviewed.   Constitutional:       General: He is not in acute distress.     Appearance: Normal appearance.   HENT:      Head: Normocephalic.   Cardiovascular:      Rate and Rhythm: Normal rate and regular rhythm.      Heart sounds: Normal heart sounds.   Pulmonary:      Effort: Pulmonary effort is normal.      Breath sounds: Normal breath sounds.   Abdominal:      General: Abdomen is flat. Bowel sounds are normal.      Palpations: Abdomen is soft.   Musculoskeletal:         General: Normal range of motion.   Skin:     General: Skin is warm and dry.   Neurological:      General: No focal deficit present.      Mental Status: He is alert and oriented to person, place, and time.   Psychiatric:         Mood and Affect: Mood normal.         Behavior: Behavior normal.         Significant Labs:  I have reviewed all pertinent lab results within the past 24 hours.    Significant Diagnostics:  I have reviewed all pertinent imaging results/findings within the past 24 hours.    Assessment/Plan:     Active Diagnoses:    Diagnosis Date Noted POA    PRINCIPAL PROBLEM:  Cholelithiases [K80.20] 08/29/2023 Yes      Problems Resolved During this Admission:     VTE Risk Mitigation (From admission, onward)      None        Radha Keenan examined patient, obtained informed consent, and answered all questions.     Lesley Albert, ANP  General Surgery  [facility] - Periop Services

## 2023-08-31 ENCOUNTER — ANESTHESIA EVENT (OUTPATIENT)
Dept: SURGERY | Facility: HOSPITAL | Age: 53
End: 2023-08-31
Payer: COMMERCIAL

## 2023-08-31 NOTE — DISCHARGE INSTRUCTIONS
Dr. Keenan's Discharge Instructions   Laparoscopic Cholecystectomy     Dr. Keenan's Sutured Wound Care Instructions:    - Keep surgical dressing clean and dry for 48 hours post op, then ok to remove dressing and shower.  - Wash incision daily with antibacterial soap and water. Pat dry.   - Do not remove steri strips for 5-7 days post op. After post op day 7, ok to remove steri strips once edges of steri strips begin to curl. Do not keep steri strips in place longer than 10 days after surgery.     No lifting (over 10 pounds) or straining for 2 weeks after surgery.    No driving for 3 days after surgery, or as long as taking narcotic pain medication.     Follow low fat diet (gallbladder eating plan listed below) for the first 4 weeks after surgery. After 4 weeks, ok to advance to regular diet as tolerated.     Contact Dr. Keenan's office with any questions or concerns. [phone]      Cholelithiasis    Cholelithiasis is a form of gallbladder disease in which gallstones form in the gallbladder. The gallbladder is an organ that stores bile. Bile is made in the liver, and it helps to digest fats. Gallstones begin as small crystals and slowly grow into stones. They may cause no symptoms until the gallbladder tightens (contracts) and a gallstone is blocking the duct (gallbladder attack), which can cause pain. Cholelithiasis is also referred to as gallstones.  There are two main types of gallstones:   Cholesterol stones. These are made of hardened cholesterol and are usually yellow-green in color. They are the most common type of gallstone. Cholesterol is a white, waxy, fat-like substance that is made in the liver.   Pigment stones. These are dark in color and are made of a red-yellow substance that forms when hemoglobin from red blood cells breaks down (bilirubin).  What are the causes?  This condition may be caused by an imbalance in the substances that bile is made of. This can happen if the bile:   Has too  much bilirubin.   Has too much cholesterol.   Does not have enough bile salts. These salts help the body absorb and digest fats.  In some cases, this condition can also be caused by the gallbladder not emptying completely or often enough.  What increases the risk?  The following factors may make you more likely to develop this condition:   Being female.   Having multiple pregnancies. Health care providers sometimes advise removing diseased gallbladders before future pregnancies.   Eating a diet that is heavy in fried foods, fat, and refined carbohydrates, like white bread and white rice.   Being obese.   Being older than age 40.   Prolonged use of medicines that contain female hormones (estrogen).   Having diabetes mellitus.   Rapidly losing weight.   Having a family history of gallstones.   Being of  or [place] descent.   Having an intestinal disease such as Crohn disease.   Having metabolic syndrome.   Having cirrhosis.   Having severe types of anemia such as sickle cell anemia.  What are the signs or symptoms?  In most cases, there are no symptoms. These are known as silent gallstones. If a gallstone blocks the bile ducts, it can cause a gallbladder attack. The main symptom of a gallbladder attack is sudden pain in the upper right abdomen. The pain usually comes at night or after eating a large meal. The pain can last for one or several hours and can spread to the right shoulder or chest.  If the bile duct is blocked for more than a few hours, it can cause infection or inflammation of the gallbladder, liver, or pancreas, which may cause:   Nausea.   Vomiting.   Abdominal pain that lasts for 5 hours or more.   Fever or chills.   Yellowing of the skin or the whites of the eyes (jaundice).   Dark urine.   Light-colored stools.  How is this diagnosed?  This condition may be diagnosed based on:   A physical exam.   Your medical history.   An ultrasound of your gallbladder.   CT scan.   MRI.   Blood  tests to check for signs of infection or inflammation.   A scan of your gallbladder and bile ducts (biliary system) using nonharmful radioactive material and special cameras that can see the radioactive material (cholescintigram). This test checks to see how your gallbladder contracts and whether bile ducts are blocked.   Inserting a small tube with a camera on the end (endoscope) through your mouth to inspect bile ducts and check for blockages (endoscopic retrograde cholangiopancreatogram).  How is this treated?  Treatment for gallstones depends on the severity of the condition. Silent gallstones do not need treatment. If the gallstones cause a gallbladder attack or other symptoms, treatment may be required. Options for treatment include:   Surgery to remove the gallbladder (cholecystectomy). This is the most common treatment.   Medicines to dissolve gallstones. These are most effective at treating small gallstones. You may need to take medicines for up to 6-12 months.   Shock wave treatment (extracorporeal biliary lithotripsy). In this treatment, an ultrasound machine sends shock waves to the gallbladder to break gallstones into smaller pieces. These pieces can then be passed into the intestines or be dissolved by medicine. This is rarely used.   Removing gallstones through endoscopic retrograde cholangiopancreatogram. A small basket can be attached to the endoscope and used to capture and remove gallstones.  Follow these instructions at home:   Take over-the-counter and prescription medicines only as told by your health care provider.   Maintain a healthy weight and follow a healthy diet. This includes:  ? Reducing fatty foods, such as fried food.  ? Reducing refined carbohydrates, like white bread and white rice.  ? Increasing fiber. Aim for foods like almonds, fruit, and beans.   Keep all follow-up visits as told by your health care provider. This is important.  Contact a health care provider if:   You think  you have had a gallbladder attack.   You have been diagnosed with silent gallstones and you develop abdominal pain or indigestion.  Get help right away if:   You have pain from a gallbladder attack that lasts for more than 2 hours.   You have abdominal pain that lasts for more than 5 hours.   You have a fever or chills.   You have persistent nausea and vomiting.   You develop jaundice.   You have dark urine or light-colored stools.  Summary   Cholelithiasis (also called gallstones) is a form of gallbladder disease in which gallstones form in the gallbladder.   This condition is caused by an imbalance in the substances that make up bile. This can happen if the bile has too much cholesterol, too much bilirubin, or not enough bile salts.   You are more likely to develop this condition if you are female, pregnant, using medicines with estrogen, obese, older than age 40, or have a family history of gallstones. You may also develop gallstones if you have diabetes, an intestinal disease, cirrhosis, or metabolic syndrome.   Treatment for gallstones depends on the severity of the condition. Silent gallstones do not need treatment.   If gallstones cause a gallbladder attack or other symptoms, treatment may be needed. The most common treatment is surgery to remove the gallbladder.  This information is not intended to replace advice given to you by your health care provider. Make sure you discuss any questions you have with your health care provider.  Document Released: 12/14/2006 Document Revised: 11/30/2018 Document Reviewed: 09/03/2017  [employer] Patient Education © 2020 [employer]    Laparoscopic Cholecystectomy  Laparoscopic cholecystectomy is surgery to remove the gallbladder. The gallbladder is a pear-shaped organ that lies beneath the liver on the right side of the body. The gallbladder stores bile, which is a fluid that helps the body to digest fats. Cholecystectomy is often done for inflammation of the gallbladder  (cholecystitis). This condition is usually caused by a buildup of gallstones (cholelithiasis) in the gallbladder. Gallstones can block the flow of bile, which can result in inflammation and pain. In severe cases, emergency surgery may be required.  This procedure is done though small incisions in your abdomen (laparoscopic surgery). A thin scope with a camera (laparoscope) is inserted through one incision. Thin surgical instruments are inserted through the other incisions. In some cases, a laparoscopic procedure may be turned into a type of surgery that is done through a larger incision (open surgery).  What happens during the procedure?     To reduce your risk of infection:  ? Your health care team will wash or sanitize their hands.  ? Your skin will be washed with soap.  ? Hair may be removed from the surgical area.   An IV tube may be inserted into one of your veins.   You will be given one or more of the following:  ? A medicine to help you relax (sedative).  ? A medicine to make you fall asleep (general anesthetic).   A breathing tube will be placed in your mouth.   Your surgeon will make several small cuts (incisions) in your abdomen.   The laparoscope will be inserted through one of the small incisions. The camera on the laparoscope will send images to a TV screen (monitor) in the operating room. This lets your surgeon see inside your abdomen.   Air-like gas will be pumped into your abdomen. This will expand your abdomen to give the surgeon more room to perform the surgery.   Other tools that are needed for the procedure will be inserted through the other incisions. The gallbladder will be removed through one of the incisions.   Your common bile duct may be examined. If stones are found in the common bile duct, they may be removed.   After your gallbladder has been removed, the incisions will be closed with stitches (sutures), staples, or skin glue.   Your incisions may be covered with a bandage  (dressing).  The procedure may vary among health care providers and hospitals.  What happens after the procedure?   Your blood pressure, heart rate, breathing rate, and blood oxygen level will be monitored until the medicines you were given have worn off.   You will be given medicines as needed to control your pain.    This information is not intended to replace advice given to you by your health care provider. Make sure you discuss any questions you have with your health care provider.  Document Released: 12/18/2006 Document Revised: 11/30/2018 Document Reviewed: 06/05/2017  [employer] Patient Education © 2020 [employer]    Gallbladder Eating Plan  If you have a gallbladder condition, you may have trouble digesting fats. Eating a low-fat diet can help reduce your symptoms, and may be helpful before and after having surgery to remove your gallbladder (cholecystectomy). Your health care provider may recommend that you work with a diet and nutrition specialist (dietitian) to help you reduce the amount of fat in your diet.  What are tips for following this plan?  General guidelines   Limit your fat intake to less than 30% of your total daily calories. If you eat around 1,800 calories each day, this is less than 60 grams (g) of fat per day.   Fat is an important part of a healthy diet. Eating a low-fat diet can make it hard to maintain a healthy body weight. Ask your dietitian how much fat, calories, and other nutrients you need each day.   Eat small, frequent meals throughout the day instead of three large meals.   Drink at least 8-10 cups of fluid a day. Drink enough fluid to keep your urine clear or pale yellow.   Limit alcohol intake to no more than 1 drink a day for nonpregnant women and 2 drinks a day for men. One drink equals 12 oz of beer, 5 oz of wine, or 1½ oz of hard liquor.  Reading food labels   Check Nutrition Facts on food labels for the amount of fat per serving. Choose foods with less than 3 grams of  fat per serving.  Shopping   Choose nonfat and low-fat healthy foods. Look for the words nonfat, low fat, or fat free.   Avoid buying processed or prepackaged foods.  Cooking   Cook using low-fat methods, such as baking, broiling, grilling, or boiling.   Cook with small amounts of healthy fats, such as olive oil, grapeseed oil, canola oil, or sunflower oil.  What foods are recommended?     All fresh, frozen, or canned fruits and vegetables.   Whole grains.   Low-fat or non-fat (skim) milk and yogurt.   Lean meat, skinless poultry, fish, eggs, and beans.   Low-fat protein supplement powders or drinks.   Spices and herbs.  What foods are not recommended?   High-fat foods. These include baked goods, fast food, fatty cuts of meat, ice cream, french toast, sweet rolls, pizza, cheese bread, foods covered with butter, creamy sauces, or cheese.   Fried foods. These include french fries, tempura, battered fish, breaded chicken, fried breads, and sweets.   Foods with strong odors.   Foods that cause bloating and gas.  Summary   A low-fat diet can be helpful if you have a gallbladder condition, or before and after gallbladder surgery.   Limit your fat intake to less than 30% of your total daily calories. This is about 60 g of fat if you eat 1,800 calories each day.   Eat small, frequent meals throughout the day instead of three large meals.  This information is not intended to replace advice given to you by your health care provider. Make sure you discuss any questions you have with your health care provider.  Document Released: 12/23/2014 Document Revised: 04/09/2020 Document Reviewed: 01/25/2018  [employer] Patient Education © 2020 [employer]    Laparoscopic Cholecystectomy, Care After  This sheet gives you information about how to care for yourself after your procedure. Your health care provider may also give you more specific instructions. If you have problems or questions, contact your health care provider.  What  can I expect after the procedure?  After the procedure, it is common to have:   Pain at your incision sites. You will be given medicines to control this pain.   Mild nausea or vomiting.   Bloating and possible shoulder pain from the air-like gas that was used during the procedure.  Follow these instructions at home:  Incision care     Follow instructions from your health care provider about how to take care of your incisions. Make sure you:  ? Wash your hands with soap and water before you change your bandage (dressing). If soap and water are not available, use hand .  ? Change your dressing as told by your health care provider.  ? Leave stitches (sutures), skin glue, or adhesive strips in place. These skin closures may need to be in place for 2 weeks or longer. If adhesive strip edges start to loosen and curl up, you may trim the loose edges. Do not remove adhesive strips completely unless your health care provider tells you to do that.   Do not take baths, swim, or use a hot tub until your health care provider approves. Ask your health care provider if you can take showers. You may only be allowed to take sponge baths for bathing.   Check your incision area every day for signs of infection. Check for:  ? More redness, swelling, or pain.  ? More fluid or blood.  ? Warmth.  ? Pus or a bad smell.  Activity   Do not drive or use heavy machinery while taking prescription pain medicine.   Do not lift anything that is heavier than 10 lb (4.5 kg) until your health care provider approves.   Do not play contact sports until your health care provider approves.   Rest as needed. Do not return to work or school until your health care provider approves.  General instructions   Take over-the-counter and prescription medicines only as told by your health care provider.   To prevent or treat constipation while you are taking prescription pain medicine, your health care provider may recommend that you:  ? Drink enough  fluid to keep your urine clear or pale yellow.  ? Take over-the-counter or prescription medicines.  ? Eat foods that are high in fiber, such as fresh fruits and vegetables, whole grains, and beans.  ? Limit foods that are high in fat and processed sugars, such as fried and sweet foods.  Contact a health care provider if:   You develop a rash.   You have more redness, swelling, or pain around your incisions.   You have more fluid or blood coming from your incisions.   Your incisions feel warm to the touch.   You have pus or a bad smell coming from your incisions.   You have a fever.   One or more of your incisions breaks open.  Get help right away if:   You have trouble breathing.   You have chest pain.   You have increasing pain in your shoulders.   You faint or feel dizzy when you stand.   You have severe pain in your abdomen.   You have nausea or vomiting that lasts for more than one day.   You have leg pain.  This information is not intended to replace advice given to you by your health care provider. Make sure you discuss any questions you have with your health care provider.  Document Released: 12/18/2006 Document Revised: 11/30/2018 Document Reviewed: 06/05/2017  [employer] Patient Education © 2020 [employer]

## 2023-09-01 ENCOUNTER — ANESTHESIA (OUTPATIENT)
Dept: SURGERY | Facility: HOSPITAL | Age: 53
End: 2023-09-01
Payer: COMMERCIAL

## 2023-09-01 ENCOUNTER — HOSPITAL ENCOUNTER (OUTPATIENT)
Facility: HOSPITAL | Age: 53
Discharge: HOME | End: 2023-09-01
Attending: SURGERY | Admitting: SURGERY
Payer: COMMERCIAL

## 2023-09-01 VITALS
OXYGEN SATURATION: 98 % | RESPIRATION RATE: 18 BRPM | HEIGHT: 69 IN | SYSTOLIC BLOOD PRESSURE: 111 MMHG | TEMPERATURE: 98 F | DIASTOLIC BLOOD PRESSURE: 78 MMHG | HEART RATE: 65 BPM | WEIGHT: 201.06 LBS | BODY MASS INDEX: 29.78 KG/M2

## 2023-09-01 DIAGNOSIS — K80.20 CALCULUS OF GALLBLADDER WITHOUT CHOLECYSTITIS WITHOUT OBSTRUCTION: ICD-10-CM

## 2023-09-01 DIAGNOSIS — K80.20 CHOLELITHIASES: ICD-10-CM

## 2023-09-01 PROCEDURE — 25000003 PHARM REV CODE 250: Performed by: NURSE PRACTITIONER

## 2023-09-01 PROCEDURE — 37000008 HC ANESTHESIA 1ST 15 MINUTES: Performed by: SURGERY

## 2023-09-01 PROCEDURE — 37000009 HC ANESTHESIA EA ADD 15 MINS: Performed by: SURGERY

## 2023-09-01 PROCEDURE — D9220A PRA ANESTHESIA: Mod: CRNA,,, | Performed by: NURSE ANESTHETIST, CERTIFIED REGISTERED

## 2023-09-01 PROCEDURE — 36000708 HC OR TIME LEV III 1ST 15 MIN: Performed by: SURGERY

## 2023-09-01 PROCEDURE — 63600175 PHARM REV CODE 636 W HCPCS: Performed by: NURSE ANESTHETIST, CERTIFIED REGISTERED

## 2023-09-01 PROCEDURE — 63600175 PHARM REV CODE 636 W HCPCS: Performed by: SURGERY

## 2023-09-01 PROCEDURE — 27201423 OPTIME MED/SURG SUP & DEVICES STERILE SUPPLY: Performed by: SURGERY

## 2023-09-01 PROCEDURE — 71000033 HC RECOVERY, INTIAL HOUR: Performed by: SURGERY

## 2023-09-01 PROCEDURE — 71000015 HC POSTOP RECOV 1ST HR: Performed by: SURGERY

## 2023-09-01 PROCEDURE — 47562 PR LAP,CHOLECYSTECTOMY: ICD-10-PCS | Mod: ,,, | Performed by: SURGERY

## 2023-09-01 PROCEDURE — 63600175 PHARM REV CODE 636 W HCPCS: Performed by: ANESTHESIOLOGY

## 2023-09-01 PROCEDURE — 25000003 PHARM REV CODE 250: Performed by: NURSE ANESTHETIST, CERTIFIED REGISTERED

## 2023-09-01 PROCEDURE — C9290 INJ, BUPIVACAINE LIPOSOME: HCPCS | Performed by: SURGERY

## 2023-09-01 PROCEDURE — 47562 LAPAROSCOPIC CHOLECYSTECTOMY: CPT | Mod: ,,, | Performed by: SURGERY

## 2023-09-01 PROCEDURE — 63600175 PHARM REV CODE 636 W HCPCS: Performed by: NURSE PRACTITIONER

## 2023-09-01 PROCEDURE — D9220A PRA ANESTHESIA: ICD-10-PCS | Mod: CRNA,,, | Performed by: NURSE ANESTHETIST, CERTIFIED REGISTERED

## 2023-09-01 PROCEDURE — D9220A PRA ANESTHESIA: Mod: ANES,,, | Performed by: ANESTHESIOLOGY

## 2023-09-01 PROCEDURE — 36000709 HC OR TIME LEV III EA ADD 15 MIN: Performed by: SURGERY

## 2023-09-01 PROCEDURE — 71000016 HC POSTOP RECOV ADDL HR: Performed by: SURGERY

## 2023-09-01 PROCEDURE — D9220A PRA ANESTHESIA: ICD-10-PCS | Mod: ANES,,, | Performed by: ANESTHESIOLOGY

## 2023-09-01 RX ORDER — MORPHINE SULFATE 4 MG/ML
2 INJECTION, SOLUTION INTRAMUSCULAR; INTRAVENOUS
Status: DISCONTINUED | OUTPATIENT
Start: 2023-09-01 | End: 2023-09-01 | Stop reason: HOSPADM

## 2023-09-01 RX ORDER — HYDROCODONE BITARTRATE AND ACETAMINOPHEN 7.5; 325 MG/1; MG/1
1 TABLET ORAL EVERY 6 HOURS PRN
Qty: 12 TABLET | Refills: 0 | Status: SHIPPED | OUTPATIENT
Start: 2023-09-01

## 2023-09-01 RX ORDER — PROMETHAZINE HYDROCHLORIDE 12.5 MG/1
12.5 TABLET ORAL EVERY 6 HOURS PRN
Qty: 20 TABLET | Refills: 0 | Status: SHIPPED | OUTPATIENT
Start: 2023-09-01

## 2023-09-01 RX ORDER — GABAPENTIN 300 MG/1
300 CAPSULE ORAL
Status: COMPLETED | OUTPATIENT
Start: 2023-09-01 | End: 2023-09-01

## 2023-09-01 RX ORDER — METHOCARBAMOL 100 MG/ML
1000 INJECTION, SOLUTION INTRAMUSCULAR; INTRAVENOUS ONCE
Status: DISCONTINUED | OUTPATIENT
Start: 2023-09-01 | End: 2023-09-01

## 2023-09-01 RX ORDER — MIDAZOLAM HYDROCHLORIDE 1 MG/ML
2 INJECTION INTRAMUSCULAR; INTRAVENOUS
Status: COMPLETED | OUTPATIENT
Start: 2023-09-01 | End: 2023-09-01

## 2023-09-01 RX ORDER — HYDROMORPHONE HYDROCHLORIDE 2 MG/ML
0.4 INJECTION, SOLUTION INTRAMUSCULAR; INTRAVENOUS; SUBCUTANEOUS EVERY 5 MIN PRN
Status: DISCONTINUED | OUTPATIENT
Start: 2023-09-01 | End: 2023-09-01

## 2023-09-01 RX ORDER — CELECOXIB 200 MG/1
200 CAPSULE ORAL
Status: COMPLETED | OUTPATIENT
Start: 2023-09-01 | End: 2023-09-01

## 2023-09-01 RX ORDER — ONDANSETRON 2 MG/ML
4 INJECTION INTRAMUSCULAR; INTRAVENOUS EVERY 6 HOURS PRN
Status: DISCONTINUED | OUTPATIENT
Start: 2023-09-01 | End: 2023-09-01 | Stop reason: HOSPADM

## 2023-09-01 RX ORDER — PROPOFOL 10 MG/ML
VIAL (ML) INTRAVENOUS
Status: DISCONTINUED | OUTPATIENT
Start: 2023-09-01 | End: 2023-09-01

## 2023-09-01 RX ORDER — MEPERIDINE HYDROCHLORIDE 25 MG/ML
50 INJECTION INTRAMUSCULAR; INTRAVENOUS; SUBCUTANEOUS EVERY 4 HOURS PRN
Status: DISCONTINUED | OUTPATIENT
Start: 2023-09-01 | End: 2023-09-01 | Stop reason: HOSPADM

## 2023-09-01 RX ORDER — FENTANYL CITRATE 50 UG/ML
INJECTION, SOLUTION INTRAMUSCULAR; INTRAVENOUS
Status: DISCONTINUED | OUTPATIENT
Start: 2023-09-01 | End: 2023-09-01

## 2023-09-01 RX ORDER — SODIUM CHLORIDE, SODIUM LACTATE, POTASSIUM CHLORIDE, CALCIUM CHLORIDE 600; 310; 30; 20 MG/100ML; MG/100ML; MG/100ML; MG/100ML
INJECTION, SOLUTION INTRAVENOUS CONTINUOUS
Status: DISCONTINUED | OUTPATIENT
Start: 2023-09-01 | End: 2023-09-01 | Stop reason: HOSPADM

## 2023-09-01 RX ORDER — MEPERIDINE HYDROCHLORIDE 25 MG/ML
12.5 INJECTION INTRAMUSCULAR; INTRAVENOUS; SUBCUTANEOUS ONCE
Status: DISCONTINUED | OUTPATIENT
Start: 2023-09-01 | End: 2023-09-01

## 2023-09-01 RX ORDER — SODIUM CHLORIDE, SODIUM LACTATE, POTASSIUM CHLORIDE, CALCIUM CHLORIDE 600; 310; 30; 20 MG/100ML; MG/100ML; MG/100ML; MG/100ML
INJECTION, SOLUTION INTRAVENOUS CONTINUOUS
Status: DISCONTINUED | OUTPATIENT
Start: 2023-09-01 | End: 2023-09-01

## 2023-09-01 RX ORDER — DEXAMETHASONE SODIUM PHOSPHATE 4 MG/ML
INJECTION, SOLUTION INTRA-ARTICULAR; INTRALESIONAL; INTRAMUSCULAR; INTRAVENOUS; SOFT TISSUE
Status: DISCONTINUED | OUTPATIENT
Start: 2023-09-01 | End: 2023-09-01

## 2023-09-01 RX ORDER — SODIUM CHLORIDE 9 MG/ML
INJECTION, SOLUTION INTRAVENOUS CONTINUOUS
Status: DISCONTINUED | OUTPATIENT
Start: 2023-09-01 | End: 2023-09-01

## 2023-09-01 RX ORDER — BUPIVACAINE HYDROCHLORIDE 5 MG/ML
INJECTION, SOLUTION EPIDURAL; INTRACAUDAL
Status: DISCONTINUED | OUTPATIENT
Start: 2023-09-01 | End: 2023-09-01 | Stop reason: HOSPADM

## 2023-09-01 RX ORDER — PHENYLEPHRINE HYDROCHLORIDE 10 MG/ML
INJECTION INTRAVENOUS
Status: DISCONTINUED | OUTPATIENT
Start: 2023-09-01 | End: 2023-09-01

## 2023-09-01 RX ORDER — TRAMADOL HYDROCHLORIDE 50 MG/1
50 TABLET ORAL EVERY 4 HOURS PRN
Status: DISCONTINUED | OUTPATIENT
Start: 2023-09-01 | End: 2023-09-01 | Stop reason: HOSPADM

## 2023-09-01 RX ORDER — SODIUM CHLORIDE, SODIUM GLUCONATE, SODIUM ACETATE, POTASSIUM CHLORIDE AND MAGNESIUM CHLORIDE 30; 37; 368; 526; 502 MG/100ML; MG/100ML; MG/100ML; MG/100ML; MG/100ML
INJECTION, SOLUTION INTRAVENOUS CONTINUOUS
Status: DISCONTINUED | OUTPATIENT
Start: 2023-09-01 | End: 2023-09-01

## 2023-09-01 RX ORDER — ACETAMINOPHEN 500 MG
1000 TABLET ORAL
Status: COMPLETED | OUTPATIENT
Start: 2023-09-01 | End: 2023-09-01

## 2023-09-01 RX ORDER — ROCURONIUM BROMIDE 10 MG/ML
INJECTION, SOLUTION INTRAVENOUS
Status: DISCONTINUED | OUTPATIENT
Start: 2023-09-01 | End: 2023-09-01

## 2023-09-01 RX ORDER — PROCHLORPERAZINE EDISYLATE 5 MG/ML
5 INJECTION INTRAMUSCULAR; INTRAVENOUS EVERY 6 HOURS PRN
Status: DISCONTINUED | OUTPATIENT
Start: 2023-09-01 | End: 2023-09-01 | Stop reason: HOSPADM

## 2023-09-01 RX ORDER — ONDANSETRON 4 MG/1
4 TABLET, ORALLY DISINTEGRATING ORAL
Status: COMPLETED | OUTPATIENT
Start: 2023-09-01 | End: 2023-09-01

## 2023-09-01 RX ORDER — PROMETHAZINE HYDROCHLORIDE 25 MG/ML
12.5 INJECTION, SOLUTION INTRAMUSCULAR; INTRAVENOUS EVERY 6 HOURS PRN
Status: DISCONTINUED | OUTPATIENT
Start: 2023-09-01 | End: 2023-09-01 | Stop reason: HOSPADM

## 2023-09-01 RX ORDER — LIDOCAINE HYDROCHLORIDE 20 MG/ML
INJECTION, SOLUTION EPIDURAL; INFILTRATION; INTRACAUDAL; PERINEURAL
Status: DISCONTINUED | OUTPATIENT
Start: 2023-09-01 | End: 2023-09-01

## 2023-09-01 RX ORDER — HYDROCODONE BITARTRATE AND ACETAMINOPHEN 7.5; 325 MG/1; MG/1
1 TABLET ORAL EVERY 6 HOURS PRN
Status: DISCONTINUED | OUTPATIENT
Start: 2023-09-01 | End: 2023-09-01 | Stop reason: HOSPADM

## 2023-09-01 RX ORDER — ONDANSETRON 4 MG/1
4 TABLET, ORALLY DISINTEGRATING ORAL EVERY 6 HOURS PRN
Qty: 20 TABLET | Refills: 0 | Status: SHIPPED | OUTPATIENT
Start: 2023-09-01 | End: 2023-09-01 | Stop reason: SDUPTHER

## 2023-09-01 RX ORDER — BUPIVACAINE HYDROCHLORIDE 5 MG/ML
INJECTION, SOLUTION EPIDURAL; INTRACAUDAL
Status: DISCONTINUED
Start: 2023-09-01 | End: 2023-09-01 | Stop reason: HOSPADM

## 2023-09-01 RX ORDER — ONDANSETRON 4 MG/1
4 TABLET, ORALLY DISINTEGRATING ORAL EVERY 6 HOURS PRN
Qty: 20 TABLET | Refills: 0 | Status: SHIPPED | OUTPATIENT
Start: 2023-09-01

## 2023-09-01 RX ORDER — METHOCARBAMOL 100 MG/ML
1000 INJECTION, SOLUTION INTRAMUSCULAR; INTRAVENOUS ONCE
Status: COMPLETED | OUTPATIENT
Start: 2023-09-01 | End: 2023-09-01

## 2023-09-01 RX ORDER — ENOXAPARIN SODIUM 100 MG/ML
40 INJECTION SUBCUTANEOUS
Status: COMPLETED | OUTPATIENT
Start: 2023-09-01 | End: 2023-09-01

## 2023-09-01 RX ORDER — MIDAZOLAM HYDROCHLORIDE 1 MG/ML
2 INJECTION INTRAMUSCULAR; INTRAVENOUS ONCE AS NEEDED
Status: DISCONTINUED | OUTPATIENT
Start: 2023-09-01 | End: 2023-09-01

## 2023-09-01 RX ORDER — ONDANSETRON HYDROCHLORIDE 2 MG/ML
INJECTION, SOLUTION INTRAMUSCULAR; INTRAVENOUS
Status: DISCONTINUED | OUTPATIENT
Start: 2023-09-01 | End: 2023-09-01

## 2023-09-01 RX ORDER — ONDANSETRON 2 MG/ML
4 INJECTION INTRAMUSCULAR; INTRAVENOUS ONCE
Status: DISCONTINUED | OUTPATIENT
Start: 2023-09-01 | End: 2023-09-01

## 2023-09-01 RX ADMIN — PHENYLEPHRINE HYDROCHLORIDE 100 MCG: 10 INJECTION INTRAVENOUS at 07:09

## 2023-09-01 RX ADMIN — SODIUM CHLORIDE, POTASSIUM CHLORIDE, SODIUM LACTATE AND CALCIUM CHLORIDE: 600; 310; 30; 20 INJECTION, SOLUTION INTRAVENOUS at 07:09

## 2023-09-01 RX ADMIN — FENTANYL CITRATE 50 MCG: 50 INJECTION, SOLUTION INTRAMUSCULAR; INTRAVENOUS at 08:09

## 2023-09-01 RX ADMIN — LIDOCAINE HYDROCHLORIDE 50 MG: 20 INJECTION, SOLUTION INTRAVENOUS at 07:09

## 2023-09-01 RX ADMIN — GABAPENTIN 300 MG: 300 CAPSULE ORAL at 06:09

## 2023-09-01 RX ADMIN — METHOCARBAMOL 1000 MG: 100 INJECTION INTRAMUSCULAR; INTRAVENOUS at 08:09

## 2023-09-01 RX ADMIN — ONDANSETRON 4 MG: 4 TABLET, ORALLY DISINTEGRATING ORAL at 06:09

## 2023-09-01 RX ADMIN — ONDANSETRON 4 MG: 2 INJECTION INTRAMUSCULAR; INTRAVENOUS at 07:09

## 2023-09-01 RX ADMIN — DEXAMETHASONE SODIUM PHOSPHATE 8 MG: 4 INJECTION, SOLUTION INTRA-ARTICULAR; INTRALESIONAL; INTRAMUSCULAR; INTRAVENOUS; SOFT TISSUE at 07:09

## 2023-09-01 RX ADMIN — ROCURONIUM BROMIDE 50 MG: 50 INJECTION INTRAVENOUS at 07:09

## 2023-09-01 RX ADMIN — ENOXAPARIN SODIUM 40 MG: 40 INJECTION SUBCUTANEOUS at 06:09

## 2023-09-01 RX ADMIN — SUGAMMADEX 200 MG: 100 INJECTION, SOLUTION INTRAVENOUS at 08:09

## 2023-09-01 RX ADMIN — CELECOXIB 200 MG: 200 CAPSULE ORAL at 06:09

## 2023-09-01 RX ADMIN — MIDAZOLAM HYDROCHLORIDE 2 MG: 1 INJECTION, SOLUTION INTRAMUSCULAR; INTRAVENOUS at 07:09

## 2023-09-01 RX ADMIN — DEXTROSE MONOHYDRATE 1 G: 5 INJECTION INTRAVENOUS at 07:09

## 2023-09-01 RX ADMIN — ACETAMINOPHEN 1000 MG: 500 TABLET, FILM COATED ORAL at 06:09

## 2023-09-01 RX ADMIN — PROPOFOL 150 MG: 10 INJECTION, EMULSION INTRAVENOUS at 07:09

## 2023-09-01 RX ADMIN — FENTANYL CITRATE 50 MCG: 50 INJECTION, SOLUTION INTRAMUSCULAR; INTRAVENOUS at 07:09

## 2023-09-01 RX ADMIN — SODIUM CHLORIDE, POTASSIUM CHLORIDE, SODIUM LACTATE AND CALCIUM CHLORIDE: 600; 310; 30; 20 INJECTION, SOLUTION INTRAVENOUS at 08:09

## 2023-09-01 NOTE — BRIEF OP NOTE
[facility] - Periop Services  Brief Operative Note    Surgery Date: 9/1/2023     Surgeon(s) and Role:     * Kobe Keenan MD - Primary    Assisting Surgeon: SAV Albert NP    Pre-op Diagnosis:  Calculus of gallbladder without cholecystitis without obstruction [K80.20]    Post-op Diagnosis:  Post-Op Diagnosis Codes:     * Calculus of gallbladder without cholecystitis without obstruction [K80.20]    Procedure(s) (LRB):  CHOLECYSTECTOMY, LAPAROSCOPIC (N/A)    Anesthesia: General    Operative Findings: dictated per surgeon    Estimated Blood Loss: 10 cc         Specimens:   Specimen (24h ago, onward)       Start     Ordered    09/01/23 0740  Specimen to Pathology  RELEASE UPON ORDERING        References:    Click here for ordering Quick Tip   Question:  Release to patient  Answer:  Immediate    09/01/23 0740                      Discharge Note    OUTCOME: Patient tolerated treatment/procedure well without complication and is now ready for discharge.    DISPOSITION: Home or Self Care    FINAL DIAGNOSIS:  Cholelithiases    FOLLOWUP: In clinic    DISCHARGE INSTRUCTIONS:  Discharge instructions given to patient

## 2023-09-01 NOTE — CARE UPDATE
Received patient from the OR, he is asleep, oral airway in place, chin lift maintained, respirations full-regular-deep-clear,hob up 30 degrees.

## 2023-09-01 NOTE — OP NOTE
[facility] - Periop Services  Surgery Department  Operative Note    SUMMARY     Date of Procedure: 9/1/2023     Procedure: Procedure(s) (LRB):  CHOLECYSTECTOMY, LAPAROSCOPIC (N/A)     Surgeon(s) and Role:     * Kobe Keenan MD - Primary    Assistant: GIANA Albert NP    Pre-Operative Diagnosis: Calculus of gallbladder without cholecystitis without obstruction [K80.20]    Post-Operative Diagnosis: Post-Op Diagnosis Codes:     * Calculus of gallbladder without cholecystitis without obstruction [K80.20]    Anesthesia: General    Operative Findings (including complications, if any):  Cholelithiases    Description of Technical Procedures: The patient was taken to the operating room. Patient was placed under general anesthesia. Abdomen was prepped and draped in the usual sterile fashion. An appropriate timeout was performed. Optical tipped trocar was used to access the peritoneal cavity. Pneumoperitoneum was instituted. Abdominal exploration was negative. Gallbladder was noted and held in a cephalad direction. The infundibulum was noted and held in a lateral direction. The peritoneum overlying the infundibulum was dissected revealing the cystic duct gallbladder junction and the cystic artery. The critical view was obtained. The cystic duct and cystic artery were clipped and transected. The gallbladder was removed from the undersurface of the liver with sharp and electric dissection. Hemostasis was assured. The clips were in excellent position and there was no bleeding or leakage of bile. Gallbladder was completely removed from the liver hemostasis was assured. The gallbladder was removed from the abdomen. Once again hemostasis was assured the operative site was irrigated until clear. Trochars were removed and pneumoperitoneum released the incisions were closed with Vicryl.      Estimated Blood Loss (EBL): * No values recorded between 9/1/2023  7:38 AM and 9/1/2023  8:21 AM *             Specimens:    Specimen (24h ago, onward)       Start     Ordered    09/01/23 0740  Specimen to Pathology  RELEASE UPON ORDERING        References:    Click here for ordering Quick Tip   Question:  Release to patient  Answer:  Immediate    09/01/23 0740                            Condition: Good    Disposition: PACU - hemodynamically stable.    Attestation: I was present and scrubbed for the entire procedure.      DISCHARGE NOTE    DISCHARGE DATE:  9/1/2023    PRINCIPAL DIAGNOSIS:  Cholelithiases    OUTCOME:  Satisfactory    DISPOSITION:  Home    FOLLOWUP:  In general surgery clinic

## 2023-09-01 NOTE — ANESTHESIA PROCEDURE NOTES
Intubation    Date/Time: 9/1/2023 7:19 AM    Performed by: Luis Diana CRNA  Authorized by: Luis Diana CRNA    Intubation:     Induction:  Intravenous    Intubated:  Postinduction    Mask Ventilation:  Easy mask    Attempts:  1    Attempted By:  CRNA    Method of Intubation:  Direct    Blade:  Sher 3    Laryngeal View Grade: Grade I - full view of cords      Difficult Airway Encountered?: No      Airway Device:  Oral endotracheal tube    Airway Device Size:  7.5    Style/Cuff Inflation:  Cuffed    Tube secured:  23    Secured at:  The lips    Placement Verified By:  Capnometry    Complicating Factors:  None    Findings Post-Intubation:  BS equal bilateral

## 2023-09-01 NOTE — CARE UPDATE
Patient awakened,cade,rejected oral airway, oriented/reassured him, he denied c/o, exchanging well.

## 2023-09-01 NOTE — TRANSFER OF CARE
"Anesthesia Transfer of Care Note    Patient: Indira Robles    Procedure(s) Performed: Procedure(s) (LRB):  CHOLECYSTECTOMY, LAPAROSCOPIC (N/A)    Patient location: PACU    Anesthesia Type: general    Transport from OR: Transported from OR on room air with adequate spontaneous ventilation    Post pain: adequate analgesia    Post assessment: no apparent anesthetic complications and tolerated procedure well    Post vital signs: stable    Level of consciousness: responds to stimulation    Nausea/Vomiting: no nausea/vomiting    Complications: none    Transfer of care protocol was followed      Last vitals:   Visit Vitals  BP (!) 143/87   Pulse 66   Temp 36.6 °C (97.9 °F) (Oral)   Ht 5' 9" (1.753 m)   Wt 91.2 kg (201 lb 1 oz)   SpO2 96%   BMI 29.69 kg/m²     "

## 2023-09-01 NOTE — ANESTHESIA PREPROCEDURE EVALUATION
"                                                                                                             09/01/2023  Indira Robles is a 52 y.o., male   Pre-operative evaluation for Procedure(s) (LRB):  CHOLECYSTECTOMY, LAPAROSCOPIC (N/A)    BP (!) 143/87   Pulse 66   Temp 36.6 °C (97.9 °F) (Oral)   Ht 5' 9" (1.753 m)   Wt 91.2 kg (201 lb 1 oz)   SpO2 96%   BMI 29.69 kg/m²     Past Medical History:   Diagnosis Date    ADHD     GERD (gastroesophageal reflux disease)     Nontoxic single thyroid nodule     SI (sacroiliac) joint dysfunction        Patient Active Problem List   Diagnosis    Tremor, unspecified    Cholelithiases       Review of patient's allergies indicates:  No Known Allergies    Current Outpatient Medications   Medication Instructions    dextroamphetamine-amphetamine 5 mg Tab 10 mg, Oral, 2 times daily    diphenhydrAMINE (SOMINEX) 25 mg, Oral, Nightly PRN    famotidine (PEPCID) 40 mg, Oral, Nightly    fexofenadine-pseudoephedrine (ALLEGRA-D 24 HOUR) 180-240 mg per 24 hr tablet 1 tablet, Oral, Daily    melatonin 10 mg Tab Oral    montelukast (SINGULAIR) 10 mg, Oral, Daily    ondansetron (ZOFRAN-ODT) 4 mg, Oral, Every 6 hours PRN       Past Surgical History:   Procedure Laterality Date    SINUS SURGERY      TONSILLECTOMY AND ADENOIDECTOMY      TYMPANOSTOMY TUBE PLACEMENT      VASECTOMY      WISDOM TOOTH EXTRACTION           Lab Results   Component Value Date    WBC 6.80 08/31/2023    HGB 16.9 08/31/2023    HCT 48.1 08/31/2023    MCV 89.4 08/31/2023     08/31/2023          BMP  Lab Results   Component Value Date     08/31/2023    K 4.2 08/31/2023    CHLORIDE 99 08/31/2023    CO2 31 08/31/2023    GLUCOSE 100 08/31/2023    BUN 20.0 (H) 08/31/2023    CREATININE 1.05 08/31/2023    CALCIUM 10.4 (H) 08/31/2023    EGFRNONAA >60 05/21/2021        INR  No results for input(s): "PT", "INR", "PROTIME", "APTT" in the last 72 hours.        Diagnostic " Studies:      EKG:  Results for orders placed or performed during the hospital encounter of 08/25/23   EKG 12-lead    Collection Time: 08/25/23 10:05 PM    Narrative    Test Reason : R10.13,    Vent. Rate : 082 BPM     Atrial Rate : 082 BPM     P-R Int : 154 ms          QRS Dur : 104 ms      QT Int : 382 ms       P-R-T Axes : -05 -07 027 degrees     QTc Int : 446 ms    Normal sinus rhythm  Normal ECG  No previous ECGs available  Confirmed by Vick Ward MD ([id number]) on 8/26/2023 8:06:08 AM    Referred By: [email or web address]   SELF           Confirmed By:Vick Ward MD       No results found for this or any previous visit.        .      Pre-op Assessment    I have reviewed the Patient Summary Reports.    I have reviewed the NPO Status.   I have reviewed the Medications.     Review of Systems  Anesthesia Hx:  No problems with previous Anesthesia  Denies Family Hx of Anesthesia complications.   Denies Personal Hx of Anesthesia complications.   Cardiovascular:  Cardiovascular Normal  No Cardiac Complaints   Pulmonary:  Pulmonary Normal No Pulmonary Complaints   Hepatic/GI:   No Current GERD Sx       Physical Exam  General: Alert and Oriented    Airway:  Mallampati: II   Mouth Opening: Normal  TM Distance: Normal  Tongue: Normal  Neck ROM: Normal ROM    Dental:  Intact    Chest/Lungs:  Clear to auscultation, Normal Respiratory Rate    Heart:  Rate: Normal  Rhythm: Regular Rhythm        Anesthesia Plan  Type of Anesthesia, risks & benefits discussed:    Anesthesia Type: Gen ETT  Intra-op Monitoring Plan: Standard ASA Monitors  Post Op Pain Control Plan: multimodal analgesia  Induction:  IV and Inhalation  Airway Plan: Direct, Post-Induction  Informed Consent: Informed consent signed with the Patient and all parties understand the risks and agree with anesthesia plan.  All questions answered.   ASA Score: 2  Day of Surgery Review of History & Physical: H&P Update referred to the surgeon/provider.  Anesthesia Plan Notes:  Discussed Anesthetic Plan w/ Pt/Family. Questions Entertained. Accepted.    Ready For Surgery From Anesthesia Perspective.     .

## 2023-09-01 NOTE — CARE UPDATE
Dr. Barrow rounding and updated on his status, v/s, med given. Approved his transfer to post op room at any time.

## 2023-09-01 NOTE — ANESTHESIA POSTPROCEDURE EVALUATION
Anesthesia Post Evaluation    Patient: Indira Robles    Procedure(s) Performed: Procedure(s) (LRB):  CHOLECYSTECTOMY, LAPAROSCOPIC (N/A)    Final Anesthesia Type: general      Patient location during evaluation: PACU  Patient participation: Yes- Able to Participate  Level of consciousness: awake  Post-procedure vital signs: reviewed and stable  Pain management: adequate  Airway patency: patent    PONV status at discharge: vomiting (controlled) and nausea (controlled)  Anesthetic complications: no      Cardiovascular status: hemodynamically stable  Respiratory status: spontaneous ventilation and unassisted  Hydration status: euvolemic  Follow-up not needed.  Comments:              Vitals Value Taken Time   /73 09/01/23 1016   Temp 36.7 °C (98.1 °F) 09/01/23 0915   Pulse 59 09/01/23 1016   Resp 18 09/01/23 0915   SpO2 95 % 09/01/23 1016         Event Time   Out of Recovery 09:14:00         Pain/Cuca Score: Pain Rating Prior to Med Admin: 0 (9/1/2023  7:12 AM)  Cuca Score: 10 (9/1/2023  9:15 AM)

## 2023-09-05 LAB — PSYCHE PATHOLOGY RESULT: NORMAL

## 2023-09-18 ENCOUNTER — OFFICE VISIT (OUTPATIENT)
Dept: SURGERY | Facility: CLINIC | Age: 53
End: 2023-09-18
Payer: COMMERCIAL

## 2023-09-18 VITALS
SYSTOLIC BLOOD PRESSURE: 144 MMHG | HEIGHT: 69 IN | BODY MASS INDEX: 29.33 KG/M2 | WEIGHT: 198 LBS | HEART RATE: 80 BPM | DIASTOLIC BLOOD PRESSURE: 95 MMHG

## 2023-09-18 DIAGNOSIS — Z48.89 POSTOPERATIVE VISIT: Primary | ICD-10-CM

## 2023-09-18 PROCEDURE — 99024 POSTOP FOLLOW-UP VISIT: CPT | Mod: ,,, | Performed by: NURSE PRACTITIONER

## 2023-09-18 PROCEDURE — 1159F MED LIST DOCD IN RCRD: CPT | Mod: CPTII,,, | Performed by: NURSE PRACTITIONER

## 2023-09-18 PROCEDURE — 3044F PR MOST RECENT HEMOGLOBIN A1C LEVEL <7.0%: ICD-10-PCS | Mod: CPTII,,, | Performed by: NURSE PRACTITIONER

## 2023-09-18 PROCEDURE — 3008F BODY MASS INDEX DOCD: CPT | Mod: CPTII,,, | Performed by: NURSE PRACTITIONER

## 2023-09-18 PROCEDURE — 3077F PR MOST RECENT SYSTOLIC BLOOD PRESSURE >= 140 MM HG: ICD-10-PCS | Mod: CPTII,,, | Performed by: NURSE PRACTITIONER

## 2023-09-18 PROCEDURE — 3080F DIAST BP >= 90 MM HG: CPT | Mod: CPTII,,, | Performed by: NURSE PRACTITIONER

## 2023-09-18 PROCEDURE — 3008F PR BODY MASS INDEX (BMI) DOCUMENTED: ICD-10-PCS | Mod: CPTII,,, | Performed by: NURSE PRACTITIONER

## 2023-09-18 PROCEDURE — 3044F HG A1C LEVEL LT 7.0%: CPT | Mod: CPTII,,, | Performed by: NURSE PRACTITIONER

## 2023-09-18 PROCEDURE — 3080F PR MOST RECENT DIASTOLIC BLOOD PRESSURE >= 90 MM HG: ICD-10-PCS | Mod: CPTII,,, | Performed by: NURSE PRACTITIONER

## 2023-09-18 PROCEDURE — 3077F SYST BP >= 140 MM HG: CPT | Mod: CPTII,,, | Performed by: NURSE PRACTITIONER

## 2023-09-18 PROCEDURE — 1159F PR MEDICATION LIST DOCUMENTED IN MEDICAL RECORD: ICD-10-PCS | Mod: CPTII,,, | Performed by: NURSE PRACTITIONER

## 2023-09-18 PROCEDURE — 99024 PR POST-OP FOLLOW-UP VISIT: ICD-10-PCS | Mod: ,,, | Performed by: NURSE PRACTITIONER

## 2023-09-18 NOTE — PROGRESS NOTES
Patient ID: [id number]     Chief Complaint: Post-op Evaluation (Lap leonardo 9/1/23)    HPI:     Indira Robles is a 52 y.o. male here today for postoperative visit of lap cholecystectomy on 9/1/2023. Pt's incisions are healing well, denies any abd pain. having normal bowel movements, passing flatus, no NVD. no constipation.     Pathology: MILD CHRONIC CHOLECYSTITIS WITH CHOLELITHIASIS AND CHOLESTEROLOSIS.     Patient Care Team:  Low Yoon MD as PCP - General (Family Medicine)     Past Medical History:   Diagnosis Date    ADHD     GERD (gastroesophageal reflux disease)     Nontoxic single thyroid nodule     SI (sacroiliac) joint dysfunction         Past Surgical History:   Procedure Laterality Date    LAPAROSCOPIC CHOLECYSTECTOMY N/A 9/1/2023    Procedure: CHOLECYSTECTOMY, LAPAROSCOPIC;  Surgeon: Kobe Keenan MD;  Location: [facility];  Service: General;  Laterality: N/A;    SINUS SURGERY      TONSILLECTOMY AND ADENOIDECTOMY      TYMPANOSTOMY TUBE PLACEMENT      VASECTOMY      WISDOM TOOTH EXTRACTION          Social History     Tobacco Use    Smoking status: Never    Smokeless tobacco: Never   Substance and Sexual Activity    Alcohol use: Not Currently    Drug use: Never    Sexual activity: Yes     Partners: Female        Current Outpatient Medications   Medication Instructions    dextroamphetamine-amphetamine 5 mg Tab 10 mg, Oral, 2 times daily    diphenhydrAMINE (SOMINEX) 25 mg, Oral, Nightly PRN    famotidine (PEPCID) 40 mg, Oral, Nightly    fexofenadine-pseudoephedrine (ALLEGRA-D 24 HOUR) 180-240 mg per 24 hr tablet 1 tablet, Oral, Daily    HYDROcodone-acetaminophen (NORCO) 7.5-325 mg per tablet 1 tablet, Oral, Every 6 hours PRN    melatonin 10 mg Tab Oral    montelukast (SINGULAIR) 10 mg, Oral, Daily    ondansetron (ZOFRAN-ODT) 4 mg, Oral, Every 6 hours PRN    promethazine (PHENERGAN) 12.5 mg, Oral, Every 6 hours PRN       Review of patient's allergies indicates:  No Known Allergies     Subjective:  "    Review of Systems    12 point review of systems conducted, negative except as stated in the history of present illness. See HPI for details.    Objective:     Visit Vitals  BP (!) 144/95   Pulse 80   Ht 5' 9" (1.753 m)   Wt 89.8 kg (198 lb)   BMI 29.24 kg/m²     Physical Exam:  General:  Alert and oriented.    Respiratory:  Lungs are clear to auscultation, Respirations are non-labored, Breath sounds are equal.    Cardiovascular:  Normal rate, Regular rhythm, No murmur.    Gastrointestinal:  Soft, Non-tender, Non-distended, Normal bowel sounds        Musculoskeletal:  Normal range of motion, Normal strength.    Integumentary:  Warm, Dry, Pink. Lap incisions healing well. Steri strips taken off today    Neurologic:  Alert, Oriented.    Psychiatric:  Cooperative.        Assessment:     1. Postoperative visit            Plan:     1. Postoperative visit    - Incisions healing well   - ok to take adhesive off with adhesive remover   - ok to get in tub, swim, wash with any soap, and still no lifting >15# x's 2 weeks.  - ok to advance diet as tolerated   - ED precautions given to patient and is to call the office immediately or go to the emergency room if he notices any redness, swelling, severe pain, increase nausea/vomiting, fever, irregular bowel movements or severe diarrhea      No future appointments.       RAMÓN Izaguirre      "

## (undated) DEVICE — TROCAR ENDOPATH XCEL 5X100MM

## (undated) DEVICE — APPLIER CLIP ENDO MED/LG 10MM

## (undated) DEVICE — Device

## (undated) DEVICE — NDL SAFETY 22G X 1.5 ECLIPSE

## (undated) DEVICE — SUT VICRYL PLUS 4-0 FS-2 27IN

## (undated) DEVICE — CANNULA ENDOPATH XCEL 5X100MM

## (undated) DEVICE — ADHESIVE MASTISOL VIAL 48/BX

## (undated) DEVICE — TROCAR ENDOPATH XCEL 11X100MM

## (undated) DEVICE — SUT VICRYL+ 27 UR-6 VIOL

## (undated) DEVICE — SOL NORMAL USPCA 0.9%

## (undated) DEVICE — GLOVE PROTEXIS PI SYN SURG 6.0

## (undated) DEVICE — CLOSURE SKIN STERI STRIP 1/2X4

## (undated) DEVICE — TROCAR ENDOPATH XCEL 11MM 10CM

## (undated) DEVICE — BAG SPEC RETRV ENDO 4X6IN DISP

## (undated) DEVICE — SOL IRRI STRL WATER 1000ML

## (undated) DEVICE — GLOVE SURG BIOGEL LATEX SZ 7.5

## (undated) DEVICE — SYS HYDRO-SURG IRR SMOKE EVAC

## (undated) DEVICE — SCISSOR CURVED ENDOPATH 5MM

## (undated) DEVICE — GAUZE SPONGE 4X4 12PLY